# Patient Record
Sex: MALE | Race: WHITE | NOT HISPANIC OR LATINO | Employment: FULL TIME | URBAN - NONMETROPOLITAN AREA
[De-identification: names, ages, dates, MRNs, and addresses within clinical notes are randomized per-mention and may not be internally consistent; named-entity substitution may affect disease eponyms.]

---

## 2017-10-11 ENCOUNTER — OFFICE VISIT (OUTPATIENT)
Dept: URGENT CARE | Facility: PHYSICIAN GROUP | Age: 37
End: 2017-10-11
Payer: COMMERCIAL

## 2017-10-11 VITALS
TEMPERATURE: 97.8 F | WEIGHT: 158 LBS | SYSTOLIC BLOOD PRESSURE: 130 MMHG | HEIGHT: 73 IN | DIASTOLIC BLOOD PRESSURE: 90 MMHG | OXYGEN SATURATION: 95 % | HEART RATE: 90 BPM | RESPIRATION RATE: 16 BRPM | BODY MASS INDEX: 20.94 KG/M2

## 2017-10-11 DIAGNOSIS — K52.9 ACUTE GASTROENTERITIS: ICD-10-CM

## 2017-10-11 DIAGNOSIS — R10.9 ABDOMINAL PAIN, UNSPECIFIED ABDOMINAL LOCATION: ICD-10-CM

## 2017-10-11 DIAGNOSIS — L63.9 ALOPECIA AREATA: ICD-10-CM

## 2017-10-11 PROCEDURE — 99204 OFFICE O/P NEW MOD 45 MIN: CPT | Performed by: FAMILY MEDICINE

## 2017-10-11 RX ORDER — ONDANSETRON 4 MG/1
TABLET, ORALLY DISINTEGRATING ORAL
Qty: 30 TAB | Refills: 2 | Status: SHIPPED | OUTPATIENT
Start: 2017-10-11 | End: 2018-04-11

## 2017-10-11 RX ORDER — CIPROFLOXACIN 500 MG/1
TABLET, FILM COATED ORAL
Qty: 20 TAB | Refills: 2 | Status: SHIPPED | OUTPATIENT
Start: 2017-10-11 | End: 2018-04-11

## 2017-10-12 NOTE — PROGRESS NOTES
Chief Complaint:    Chief Complaint   Patient presents with   • Abdominal Pain     recent visit out of country, acquired GI infection, experiencing Sx again.       History of Present Illness:    This is a new problem. He was in [facility] recently when he developed GI symptoms. He had symptoms for 2 days before seeing doctor there on 9/29/17. He reports he had blood tests and stool tests done with immediate results. He was told he had an infection and was rx'd antibiotic BID x 5 days and a dissolving tablet to help with the stomach cramps. I showed him some pics of Cipro and Zofran ODT online and he reports the meds may have been these. He reports he improved quickly and was feeling much better within 2 days. He felt he had gotten completely better, but now feels like the symptoms are starting to return since yesterday. He has some discomfort in abdomen and soft stools. These symptoms are very similar to onset when he was in [facility]. As patient plans on returning to [facility], he reports doctor there told him to possibly bring medication with him on future trip to take if he develops similar symptoms.    He also inquires about some bald spots on his head that he has had for 2 years. Other than the balding, he is asymptomatic regarding these areas. He asked one doctor about it in the past but has not asked any other doctor about it since and inquires about this.      Review of Systems:    Constitutional: Negative for fever, chills, and diaphoresis.   Eyes: Negative for change in vision, photophobia, pain, redness, and discharge.  ENT: Negative for ear pain, ear discharge, hearing loss, tinnitus, nasal congestion, nosebleeds, and sore throat.    Respiratory: Negative for cough, hemoptysis, sputum production, shortness of breath, wheezing, and stridor.    Cardiovascular: Negative for chest pain, palpitations, orthopnea, claudication, leg swelling, and PND.   Gastrointestinal: See HPI.  Genitourinary: Negative  "for dysuria, urinary urgency, urinary frequency, hematuria, and flank pain.   Musculoskeletal: Negative for myalgias, joint pain, neck pain, and back pain.   Skin: See HPI.  Neurological: Negative for dizziness, tingling, tremors, sensory change, speech change, focal weakness, seizures, loss of consciousness, and headaches.   Endo: Negative for polydipsia.   Heme: Does not bruise/bleed easily.   Psychiatric/Behavioral: Negative for depression, suicidal ideas, hallucinations, memory loss and substance abuse. The patient is not nervous/anxious and does not have insomnia.      Past Medical History:    Past Medical History:   Diagnosis Date   • Dehydration        Past Surgical History:    No past surgical history on file.    Social History:    Social History     Social History   • Marital status: Single     Spouse name: N/A   • Number of children: N/A   • Years of education: N/A     Occupational History   • Not on file.     Social History Main Topics   • Smoking status: Never Smoker   • Smokeless tobacco: Never Used   • Alcohol use Yes   • Drug use: No   • Sexual activity: Not on file     Other Topics Concern   • Not on file     Social History Narrative   • No narrative on file       Family History:    Family History   Problem Relation Age of Onset   • Psychiatry Neg Hx    • Diabetes Neg Hx    • Heart Disease Neg Hx        Medications:    No current outpatient prescriptions on file prior to visit.     No current facility-administered medications on file prior to visit.        Allergies:    No Known Allergies      Vitals:    Vitals:    10/11/17 1743   BP: 130/90   Pulse: 90   Resp: 16   Temp: 36.6 °C (97.8 °F)   SpO2: 95%   Weight: 71.7 kg (158 lb)   Height: 1.854 m (6' 1\")       Physical Exam:    Constitutional: Vital signs reviewed. Appears well-developed and well-nourished. No acute distress.   Eyes: Sclera white, conjunctivae clear.   ENT: External ears normal. Hearing normal.   Neck: Neck supple.   Pulmonary/Chest: " Respirations non-labored.   Abdomen: He reports there is some discomfort in midline upper abdomen region when I palpate, but does not show any physical signs it is tender to palpation. Otherwise, no tenderness to palpation. Bowel sounds are normal active. Soft, non-distended.  Musculoskeletal: Normal gait. Normal range of motion. No muscular atrophy or weakness.  Neurological: Alert and oriented to person, place, and time. Muscle tone normal. Coordination normal.   Skin: Top of left side of scalp, there are 2 focal bald spots that are c/w alopecia areata.  Psychiatric: Normal mood and affect. Behavior is normal. Judgment and thought content normal.       Assessment / Plan:    1. Abdominal pain, unspecified abdominal location  - ondansetron (ZOFRAN ODT) 4 MG TABLET DISPERSIBLE; 1 TAB, ALLOW TO DISINTEGRATE IN MOUTH, EVERY 8 HOURS ONLY IF NEEDED FOR NAUSEA, VOMITING, OR STOMACH CRAMPS.  Dispense: 30 Tab; Refill: 2    2. Acute gastroenteritis  - ciprofloxacin (CIPRO) 500 MG Tab; 1 TAB TWICE A DAY X 10 DAYS.  Dispense: 20 Tab; Refill: 2    3. Alopecia areata      Discussed with him DDX and management options.    Due to history, good, quick response to meds rx'd in [facility], and feeling return of similar symptoms, recommended to treat with same meds. He agrees.    This time I will have him take Cipro x 10 days rather than 5 days due to recurrence. I gave him some RFs so he can bring meds with him on future trips to [facility] and use if needed.    Discussed with him benign nature of Alopecia Areata and advised he may use OTC Rogaine prn.    Agreeable to medications prescribed.    Follow-up with PCP or urgent care if getting worse or not better with above.

## 2018-04-11 ENCOUNTER — OFFICE VISIT (OUTPATIENT)
Dept: URGENT CARE | Facility: PHYSICIAN GROUP | Age: 38
End: 2018-04-11
Payer: COMMERCIAL

## 2018-04-11 VITALS
DIASTOLIC BLOOD PRESSURE: 80 MMHG | OXYGEN SATURATION: 99 % | HEART RATE: 76 BPM | TEMPERATURE: 98.6 F | SYSTOLIC BLOOD PRESSURE: 128 MMHG | HEIGHT: 73 IN | BODY MASS INDEX: 22 KG/M2 | WEIGHT: 166 LBS | RESPIRATION RATE: 14 BRPM

## 2018-04-11 DIAGNOSIS — S00.552A FOREIGN BODY OF TONGUE, INITIAL ENCOUNTER: ICD-10-CM

## 2018-04-11 PROCEDURE — 99214 OFFICE O/P EST MOD 30 MIN: CPT | Performed by: FAMILY MEDICINE

## 2018-04-11 NOTE — PROGRESS NOTES
Chief Complaint:    Chief Complaint   Patient presents with   • Other       History of Present Illness:    This is a new problem. The right side of his tongue is stuck to the rubber band hook for braces on right lower tooth. Reports this occl happens and usually able to get it unhooked in past, but today could not.      Review of Systems:    Constitutional: Negative for fever, chills, and diaphoresis.   Eyes: Negative for change in vision, photophobia, pain, redness, and discharge.  ENT: See HPI.   Respiratory: Negative for cough, hemoptysis, sputum production, shortness of breath, wheezing, and stridor.    Cardiovascular: Negative for chest pain, palpitations, orthopnea, claudication, leg swelling, and PND.   Gastrointestinal: Negative for abdominal pain, nausea, vomiting, diarrhea, constipation, blood in stool, and melena.   Genitourinary: Negative for dysuria, urinary urgency, urinary frequency, hematuria, and flank pain.   Musculoskeletal: Negative for myalgias, joint pain, neck pain, and back pain.   Skin: Negative for rash and itching.   Neurological: Negative for dizziness, tingling, tremors, sensory change, speech change, focal weakness, seizures, loss of consciousness, and headaches.   Endo: Negative for polydipsia.   Heme: Does not bruise/bleed easily.   Psychiatric/Behavioral: Negative for depression, suicidal ideas, hallucinations, memory loss and substance abuse. The patient is not nervous/anxious and does not have insomnia.        Past Medical History:    Past Medical History:   Diagnosis Date   • Dehydration      Past Surgical History:    History reviewed. No pertinent surgical history.    Social History:    Social History     Social History   • Marital status: Single     Spouse name: N/A   • Number of children: N/A   • Years of education: N/A     Occupational History   • Not on file.     Social History Main Topics   • Smoking status: Never Smoker   • Smokeless tobacco: Never Used   • Alcohol use Yes  "  • Drug use: No   • Sexual activity: Not on file     Other Topics Concern   • Not on file     Social History Narrative   • No narrative on file     Family History:    Family History   Problem Relation Age of Onset   • Psychiatry Neg Hx    • Diabetes Neg Hx    • Heart Disease Neg Hx      Medications:    No current outpatient prescriptions on file prior to visit.     No current facility-administered medications on file prior to visit.      Allergies:    No Known Allergies      Vitals:    Vitals:    04/11/18 1316   BP: 128/80   Pulse: 76   Resp: 14   Temp: 37 °C (98.6 °F)   SpO2: 99%   Weight: 75.3 kg (166 lb)   Height: 1.854 m (6' 1\")       Physical Exam:    Constitutional: Vital signs reviewed. Appears well-developed and well-nourished. Cannot talk due to right side of tongue stuck to braces hook.  Eyes: Sclera white, conjunctivae clear.   ENT: External ears normal. Hearing normal. Lips are normal. Oral mucosa pink and moist. Posterior pharynx: WNL. Braces hook on right lower tooth is impacted into underside of right tongue.  Neck: Neck supple.   Pulmonary/Chest: Respirations non-labored.   Musculoskeletal: Normal gait. Normal range of motion. No muscular atrophy or weakness.  Neurological: Alert and oriented to person, place, and time. Muscle tone normal. Coordination normal.   Skin: No rashes or lesions. Warm, dry, normal turgor.  Psychiatric: Normal mood and affect. Behavior is normal. Judgment and thought content normal.       Assessment / Plan:    1. Foreign body of tongue, initial encounter      Discussed with him DDX and management options.    Agreeable to 2% Lidocaine injected into impaction site underside of right tongue with 28-G needle which provided complete anesthesia at impaction site.    Tongue was manually  from braces hook without complication. Underside of right tongue has small wound without significant bleeding that should heal on its own without further intervention.    He reports he " will see his Orthodontist today to see if hook can be modified so problem does not recur.    May return to urgent care if needed.

## 2024-10-31 ENCOUNTER — APPOINTMENT (RX ONLY)
Dept: URBAN - METROPOLITAN AREA CLINIC 4 | Facility: CLINIC | Age: 44
Setting detail: DERMATOLOGY
End: 2024-10-31

## 2024-10-31 DIAGNOSIS — D22 MELANOCYTIC NEVI: ICD-10-CM

## 2024-10-31 PROBLEM — D48.5 NEOPLASM OF UNCERTAIN BEHAVIOR OF SKIN: Status: ACTIVE | Noted: 2024-10-31

## 2024-10-31 PROCEDURE — 11102 TANGNTL BX SKIN SINGLE LES: CPT

## 2024-10-31 PROCEDURE — ? BIOPSY BY SHAVE METHOD

## 2024-10-31 ASSESSMENT — LOCATION DETAILED DESCRIPTION DERM: LOCATION DETAILED: LEFT CENTRAL MALAR CHEEK

## 2024-10-31 ASSESSMENT — LOCATION SIMPLE DESCRIPTION DERM: LOCATION SIMPLE: LEFT CHEEK

## 2024-10-31 ASSESSMENT — LOCATION ZONE DERM: LOCATION ZONE: FACE
